# Patient Record
Sex: MALE | Race: ASIAN | NOT HISPANIC OR LATINO | Employment: STUDENT | URBAN - METROPOLITAN AREA
[De-identification: names, ages, dates, MRNs, and addresses within clinical notes are randomized per-mention and may not be internally consistent; named-entity substitution may affect disease eponyms.]

---

## 2022-07-02 ENCOUNTER — OFFICE VISIT (OUTPATIENT)
Dept: URGENT CARE | Facility: URGENT CARE | Age: 22
End: 2022-07-02
Payer: COMMERCIAL

## 2022-07-02 VITALS
TEMPERATURE: 98 F | HEART RATE: 78 BPM | OXYGEN SATURATION: 98 % | RESPIRATION RATE: 20 BRPM | SYSTOLIC BLOOD PRESSURE: 100 MMHG | DIASTOLIC BLOOD PRESSURE: 69 MMHG

## 2022-07-02 DIAGNOSIS — S93.402A SPRAIN OF LEFT ANKLE, UNSPECIFIED LIGAMENT, INITIAL ENCOUNTER: ICD-10-CM

## 2022-07-02 DIAGNOSIS — S99.912A ANKLE INJURY, LEFT, INITIAL ENCOUNTER: Primary | ICD-10-CM

## 2022-07-02 PROCEDURE — 99203 OFFICE O/P NEW LOW 30 MIN: CPT | Performed by: FAMILY MEDICINE

## 2022-07-02 NOTE — LETTER
[facility]  [address]  [address]  [place] [address]  Phone: [phone]  Fax: [phone]    July 2, 2022      RE:  Kathi Munoz  [address]  [place] [address]          To whom it may concern:    RE: Kathi Munoz    Patient was seen and treated today at our clinic and missed work.    Please excuse Kathi from work for 7/2/2022 due to leg injury.  Thank you.      Sincerely,        Thiago Medellin MD

## 2022-07-03 NOTE — PROGRESS NOTES
SUBJECTIVE:  Kathi Munoz, a 21 year old male scheduled an appointment to discuss the following issues:     Ankle injury, left, initial encounter  Sprain of left ankle, unspecified ligament, initial encounter    Medical, social, surgical, and family histories reviewed.       Musculoskeletal Problem (L ankle pain pt fell)  Around 6pm today, while jumping on the trampoline, landed on the trampoline with inversion injury of left ankle.  No open wound or paresthesia.  No head/neck/trunk injuries.  No LOC.      ROS:  See HPI.  No nausea/vomiting.  No fever/chills.  No chest pain/SOB.  No BM/urine problems.  No dizziness or syncope.      OBJECTIVE:  /69   Pulse 78   Temp 98  F (36.7  C)   Resp 20   SpO2 98%   EXAM:  GENERAL APPEARANCE: healthy, alert and mild distress; no evidence of head/neck injury  EYES: Eyes grossly normal to inspection, PERRL and conjunctivae and sclerae normal  HENT: ear canals and TM's normal and nose and mouth without ulcers or lesions  NECK: no adenopathy, no asymmetry, masses, or scars and thyroid normal to palpation  RESP: lungs clear to auscultation - no rales, rhonchi or wheezes  CV: regular rates and rhythm, normal S1 S2, no S3 or S4 and no murmur, click or rub  LYMPHATICS: no cervical adenopathy  ABDOMEN: soft, nontender, without hepatosplenomegaly or masses and bowel sounds normal  MS: extremities normal- no gross deformities noted; left ankle---swelling and tender left lateral malleolus and distal fibula; difficulty weight bearing. No navicular bones or metatarsals tenderness or heel tenderness; decreased ROM left ankle due to pain; Neurovascularly intact bilateral upper and lower extremities  SKIN: no suspicious lesions or rashes  NEURO: Normal strength and tone, mentation intact and speech normal    ASSESSMENT/PLAN:  (S97.919B) Ankle injury, left, initial encounter  (primary encounter diagnosis)  Comment: xray left ankle negative for fracture and dislocation  Plan: XR Ankle  Left G/E 3 Views, Ankle/Foot Bracing         Supplies Order for DME - ONLY FOR DME      (S93.402A) Sprain of left ankle, unspecified ligament, initial encounter  Comment: see above  Plan: Ankle/Foot Bracing Supplies Order for DME -         ONLY FOR DME    RICE.  Use ankle boot for splinting and crutches to aid ambulation for 1 to 3 weeks.  Pt to f/up PCP within 3 weeks if no improvement or worsening.  Warning signs and symptoms explained.